# Patient Record
Sex: MALE | Race: BLACK OR AFRICAN AMERICAN | NOT HISPANIC OR LATINO | Employment: STUDENT | URBAN - METROPOLITAN AREA
[De-identification: names, ages, dates, MRNs, and addresses within clinical notes are randomized per-mention and may not be internally consistent; named-entity substitution may affect disease eponyms.]

---
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
## 2023-03-23 ENCOUNTER — OFFICE VISIT (OUTPATIENT)
Dept: OPTOMETRY | Facility: CLINIC | Age: 23
End: 2023-03-23
Payer: COMMERCIAL

## 2023-03-23 DIAGNOSIS — H52.213 IRREGULAR ASTIGMATISM OF BOTH EYES: ICD-10-CM

## 2023-03-23 DIAGNOSIS — H18.602 KERATOCONUS OF LEFT EYE: Primary | ICD-10-CM

## 2023-03-23 PROCEDURE — 92015 PR REFRACTION: ICD-10-PCS | Mod: S$GLB,,, | Performed by: OPTOMETRIST

## 2023-03-23 PROCEDURE — 92310 PR CONTACT LENS FITTING (NO CHANGE): ICD-10-PCS | Mod: S$GLB,,, | Performed by: OPTOMETRIST

## 2023-03-23 PROCEDURE — 92015 DETERMINE REFRACTIVE STATE: CPT | Mod: S$GLB,,, | Performed by: OPTOMETRIST

## 2023-03-23 PROCEDURE — 92014 PR EYE EXAM, EST PATIENT,COMPREHESV: ICD-10-PCS | Mod: S$GLB,,, | Performed by: OPTOMETRIST

## 2023-03-23 PROCEDURE — 1159F MED LIST DOCD IN RCRD: CPT | Mod: CPTII,S$GLB,, | Performed by: OPTOMETRIST

## 2023-03-23 PROCEDURE — 92014 COMPRE OPH EXAM EST PT 1/>: CPT | Mod: S$GLB,,, | Performed by: OPTOMETRIST

## 2023-03-23 PROCEDURE — 92310 CONTACT LENS FITTING OU: CPT | Mod: S$GLB,,, | Performed by: OPTOMETRIST

## 2023-03-23 PROCEDURE — 1159F PR MEDICATION LIST DOCUMENTED IN MEDICAL RECORD: ICD-10-PCS | Mod: CPTII,S$GLB,, | Performed by: OPTOMETRIST

## 2023-03-23 PROCEDURE — 99999 PR PBB SHADOW E&M-EST. PATIENT-LVL II: ICD-10-PCS | Mod: PBBFAC,,, | Performed by: OPTOMETRIST

## 2023-03-23 PROCEDURE — 99999 PR PBB SHADOW E&M-EST. PATIENT-LVL II: CPT | Mod: PBBFAC,,, | Performed by: OPTOMETRIST

## 2023-03-23 NOTE — PROGRESS NOTES
HPI    CC: routine eye exam  NIMISHA: 08/25/2021   Unable to see out of scleral lens, has not used for 3 months  Lens was lose in a bag, not stored in solution or a case   Hx of EW up to weeks   (+) Changes in vision   (-) Pain  (-) Irritation   (-) Itching   (-) Flashes  (-) Floaters  (-) Glasses wearer  (+) CL wearer patient feel he need an updated contact lens rx  (-) Uses eye gtts  (-) Eye injury  (-) Eye surgery   (-)POHx  (-)FOHx    (-)DM  No results found for: HGBA1C        Last edited by Davon De Los Santos, OD on 3/23/2023  1:44 PM.            Assessment /Plan     For exam results, see Encounter Report.    Keratoconus of left eye  Irregular astigmatism of both eyes  Eyeglass Final Rx       Eyeglass Final Rx         Sphere Cylinder Axis    Right pl  Sphere     Left -2.50 +3.75 010      Type: SVL                  Contact Lens Final Rx       Final Contact Lens Rx         Brand Base Curve Diameter Sphere Cylinder Lens    Right          Left Synergeyes 8.4 16.0 +0.75 Sphere 3700 36/42   SEB    This is not a final prescription.  This is specialty order contact lens that requires follow up care and warranty adjustment, dispensing clinic will be responsible for follow up care and warranty adjustments.                     Ok to dispense      RTC 1 yr

## 2023-04-11 ENCOUNTER — TELEPHONE (OUTPATIENT)
Dept: OPTOMETRY | Facility: CLINIC | Age: 23
End: 2023-04-11
Payer: COMMERCIAL

## 2023-04-11 NOTE — TELEPHONE ENCOUNTER
----- Message from Jessica Cline sent at 4/11/2023  9:09 AM CDT -----  Regarding: Patient Returning Call  Pt states he is returning 2 missed calls from department number. Pt states he is waiting for contacts.    Call back-[phone]

## (undated) DEVICE — SUT MONOCRYL 3-0 SH U/D

## (undated) DEVICE — SUT MONOCRYL 4-0 UND RB-1

## (undated) DEVICE — SEE MEDLINE ITEM 152622

## (undated) DEVICE — SEE MEDLINE ITEM 152512

## (undated) DEVICE — SUT 2/0 30IN SILK BLK BRAI

## (undated) DEVICE — SPONGE DERMACEA GAUZE 4X4

## (undated) DEVICE — BLADE MINI BLADE ORANGE

## (undated) DEVICE — STAPLER SKIN ROTATING HEAD

## (undated) DEVICE — PAD ABD 8X10 STERILE

## (undated) DEVICE — PACK UNIVERSAL SPLIT II

## (undated) DEVICE — SEE MEDLINE ITEM 157128

## (undated) DEVICE — SOL BETADINE 5%

## (undated) DEVICE — SHEET EENT SPLIT

## (undated) DEVICE — ELECTRODE BLADE INSULATED 1 IN

## (undated) DEVICE — BLADE SURG #15 CARBON STEEL

## (undated) DEVICE — SEE MEDLINE ITEM 146417

## (undated) DEVICE — GOWN SURGICAL X-LARGE

## (undated) DEVICE — SYR 30CC LUER LOCK

## (undated) DEVICE — CORD BIPOLAR 12 FOOT

## (undated) DEVICE — EVACUATOR WOUND BULB 100CC

## (undated) DEVICE — DRESSING TRANS 2X2 TEGADERM

## (undated) DEVICE — TRAY MINOR GEN SURG

## (undated) DEVICE — SUT 6/0 18IN COATED VICRYL

## (undated) DEVICE — BLADE SURG CARBON STEEL #10

## (undated) DEVICE — ELECTRODE REM PLYHSV RETURN 9

## (undated) DEVICE — SYS CLSR DERMABOND PRINEO 22CM

## (undated) DEVICE — SKINMARKER & RULER REGULAR X-F

## (undated) DEVICE — FORCEP CURVED DISP

## (undated) DEVICE — TRAY MUSCLE LID EYE

## (undated) DEVICE — SUT PDSII 3-0 SH 27IN CLEAR

## (undated) DEVICE — ELECTRODE EXTENDED BLADE

## (undated) DEVICE — SPONGE LAP 18X18 PREWASHED

## (undated) DEVICE — NDL HYPO REG 25G X 1 1/2

## (undated) DEVICE — MALE VEST LARGE